# Patient Record
Sex: FEMALE | Race: WHITE | NOT HISPANIC OR LATINO | Employment: FULL TIME | URBAN - METROPOLITAN AREA
[De-identification: names, ages, dates, MRNs, and addresses within clinical notes are randomized per-mention and may not be internally consistent; named-entity substitution may affect disease eponyms.]

---

## 2017-09-29 PROBLEM — D22.9 ATYPICAL MOLE: Status: ACTIVE | Noted: 2017-09-29

## 2017-09-29 PROBLEM — L65.9 HAIR LOSS DISORDER: Status: ACTIVE | Noted: 2017-09-29

## 2017-09-29 PROBLEM — R53.83 FATIGUE: Status: ACTIVE | Noted: 2017-09-29

## 2018-11-08 PROBLEM — N20.1 URETERAL STONE: Status: ACTIVE | Noted: 2018-11-08

## 2018-11-08 PROBLEM — N20.0 KIDNEY STONE: Status: ACTIVE | Noted: 2018-11-08

## 2018-11-17 PROBLEM — N20.1 CALCULUS OF URETER: Status: ACTIVE | Noted: 2018-11-17

## 2021-05-10 ENCOUNTER — PATIENT MESSAGE (OUTPATIENT)
Dept: RESEARCH | Facility: HOSPITAL | Age: 51
End: 2021-05-10

## 2022-01-10 ENCOUNTER — OFFICE VISIT (OUTPATIENT)
Dept: URGENT CARE | Facility: CLINIC | Age: 52
End: 2022-01-10
Payer: COMMERCIAL

## 2022-01-10 VITALS
WEIGHT: 155 LBS | BODY MASS INDEX: 26.46 KG/M2 | DIASTOLIC BLOOD PRESSURE: 115 MMHG | RESPIRATION RATE: 16 BRPM | SYSTOLIC BLOOD PRESSURE: 183 MMHG | OXYGEN SATURATION: 100 % | HEIGHT: 64 IN | TEMPERATURE: 98 F | HEART RATE: 79 BPM

## 2022-01-10 DIAGNOSIS — R30.0 DYSURIA: Primary | ICD-10-CM

## 2022-01-10 LAB
BILIRUB UR QL STRIP: NEGATIVE
GLUCOSE UR QL STRIP: NEGATIVE
KETONES UR QL STRIP: NEGATIVE
LEUKOCYTE ESTERASE UR QL STRIP: POSITIVE
PH, POC UA: 5.5 (ref 5–8)
POC BLOOD, URINE: POSITIVE
POC NITRATES, URINE: NEGATIVE
PROT UR QL STRIP: POSITIVE
SP GR UR STRIP: 1.02 (ref 1–1.03)
UROBILINOGEN UR STRIP-ACNC: NORMAL (ref 0.1–1.1)

## 2022-01-10 PROCEDURE — 3008F PR BODY MASS INDEX (BMI) DOCUMENTED: ICD-10-PCS | Mod: CPTII,S$GLB,, | Performed by: FAMILY MEDICINE

## 2022-01-10 PROCEDURE — 3077F SYST BP >= 140 MM HG: CPT | Mod: CPTII,S$GLB,, | Performed by: FAMILY MEDICINE

## 2022-01-10 PROCEDURE — 1160F RVW MEDS BY RX/DR IN RCRD: CPT | Mod: CPTII,S$GLB,, | Performed by: FAMILY MEDICINE

## 2022-01-10 PROCEDURE — 1160F PR REVIEW ALL MEDS BY PRESCRIBER/CLIN PHARMACIST DOCUMENTED: ICD-10-PCS | Mod: CPTII,S$GLB,, | Performed by: FAMILY MEDICINE

## 2022-01-10 PROCEDURE — 3077F PR MOST RECENT SYSTOLIC BLOOD PRESSURE >= 140 MM HG: ICD-10-PCS | Mod: CPTII,S$GLB,, | Performed by: FAMILY MEDICINE

## 2022-01-10 PROCEDURE — 81003 URINALYSIS AUTO W/O SCOPE: CPT | Mod: QW,S$GLB,, | Performed by: FAMILY MEDICINE

## 2022-01-10 PROCEDURE — 99203 OFFICE O/P NEW LOW 30 MIN: CPT | Mod: 25,S$GLB,, | Performed by: FAMILY MEDICINE

## 2022-01-10 PROCEDURE — 81003 POCT URINALYSIS, DIPSTICK, AUTOMATED, W/O SCOPE: ICD-10-PCS | Mod: QW,S$GLB,, | Performed by: FAMILY MEDICINE

## 2022-01-10 PROCEDURE — 1159F MED LIST DOCD IN RCRD: CPT | Mod: CPTII,S$GLB,, | Performed by: FAMILY MEDICINE

## 2022-01-10 PROCEDURE — 99203 PR OFFICE/OUTPT VISIT, NEW, LEVL III, 30-44 MIN: ICD-10-PCS | Mod: 25,S$GLB,, | Performed by: FAMILY MEDICINE

## 2022-01-10 PROCEDURE — 3080F DIAST BP >= 90 MM HG: CPT | Mod: CPTII,S$GLB,, | Performed by: FAMILY MEDICINE

## 2022-01-10 PROCEDURE — 1159F PR MEDICATION LIST DOCUMENTED IN MEDICAL RECORD: ICD-10-PCS | Mod: CPTII,S$GLB,, | Performed by: FAMILY MEDICINE

## 2022-01-10 PROCEDURE — 3008F BODY MASS INDEX DOCD: CPT | Mod: CPTII,S$GLB,, | Performed by: FAMILY MEDICINE

## 2022-01-10 PROCEDURE — 3080F PR MOST RECENT DIASTOLIC BLOOD PRESSURE >= 90 MM HG: ICD-10-PCS | Mod: CPTII,S$GLB,, | Performed by: FAMILY MEDICINE

## 2022-01-10 RX ORDER — PHENAZOPYRIDINE HYDROCHLORIDE 200 MG/1
200 TABLET, FILM COATED ORAL 3 TIMES DAILY PRN
Qty: 6 TABLET | Refills: 2 | Status: SHIPPED | OUTPATIENT
Start: 2022-01-10 | End: 2022-01-12

## 2022-01-10 RX ORDER — FLUCONAZOLE 150 MG/1
150 TABLET ORAL DAILY
Qty: 3 TABLET | Refills: 1 | Status: SHIPPED | OUTPATIENT
Start: 2022-01-10 | End: 2022-01-11

## 2022-01-10 RX ORDER — CIPROFLOXACIN 500 MG/1
500 TABLET ORAL 2 TIMES DAILY
Qty: 20 TABLET | Refills: 0 | Status: SHIPPED | OUTPATIENT
Start: 2022-01-10 | End: 2022-01-20

## 2022-01-10 NOTE — PROGRESS NOTES
"Subjective:       Patient ID: Renetta Olvera is a 51 y.o. female.    Vitals:  height is 5' 4" (1.626 m) and weight is 70.3 kg (155 lb). Her temperature is 97.7 °F (36.5 °C). Her blood pressure is 183/115 (abnormal) and her pulse is 79. Her respiration is 16 and oxygen saturation is 100%.     Chief Complaint: Urinary Tract Infection    Patient presents to clinic with complaint of uti symptoms. She states that it has been going on for 5 days.     Urinary Tract Infection   This is a new problem. The current episode started in the past 7 days. The problem occurs every urination. The problem has been unchanged. The quality of the pain is described as burning. The pain is at a severity of 2/10. The pain is mild. There has been no fever. She is sexually active. There is no history of pyelonephritis. Associated symptoms include frequency and urgency. Pertinent negatives include no behavior changes, chills, discharge, flank pain, hematuria, hesitancy, nausea, possible pregnancy, sweats, vomiting, weight loss, bubble bath use, constipation, rash or withholding. She has tried nothing for the symptoms. The treatment provided no relief.       Constitution: Negative for chills.   Gastrointestinal: Negative for nausea, vomiting and constipation.   Genitourinary: Positive for frequency and urgency. Negative for flank pain and hematuria.   Skin: Negative for rash.       Objective:      Physical Exam      Physical Exam  Vitals signs and nursing note reviewed.   Constitutional:       Appearance: Pt is well-developed. Alert, NAD  HENT:      Head: Normocephalic and atraumatic. Pt appears well-developed and well-nourished. Pt is cooperative.  Non-toxic appearance. Pt does not have a sickly appearance. Pt does not appear ill. No distress     Right Ear: External ear normal. external ear and ear canal normal.      Left Ear: External ear normal. external ear and ear canal normal.   Eyes:      General: Lids are normal.      " Conjunctiva/sclera: Conjunctivae normal. Visual tracking is normal. Right eye exhibits no exudate. Left eye exhibits no exudate. No scleral icterus.     Pupils: Pupils are equal, round  Neck:      Musculoskeletal: Full passive range of motion without pain and neck supple.      Trachea: Trachea and phonation normal.   Cardiovascular:      Rate and Rhythm: Normal rate. Extremities well perfused.   Pulmonary:      Effort: Pulmonary effort is normal. No respiratory distress.       Abdomen: NO obvious distention. SP tenderness  Musculoskeletal: Normal range of motion. No ambulation issues  Skin:     General: Skin is warm and dry. No open wounds or abrasions. No petechiae No cyanosis  no jaundice not diaphoretic, not pale, not purpuric  Neurological:      Mental Status:Pt is alert and oriented to person, place, and time.   Psychiatric:         Speech: Speech normal.         Behavior: Behavior normal.         Thought Content: Thought content normal.         Judgment: Judgment normal.         Assessment:       1. Dysuria          Plan:         Dysuria  -     POCT Urinalysis, Dipstick, Automated, W/O Scope    Other orders  -     ciprofloxacin HCl (CIPRO) 500 MG tablet; Take 1 tablet (500 mg total) by mouth 2 (two) times daily. for 10 days  Dispense: 20 tablet; Refill: 0  -     fluconazole (DIFLUCAN) 150 MG Tab; Take 1 tablet (150 mg total) by mouth once daily. for 1 day  Dispense: 3 tablet; Refill: 1  -     phenazopyridine (PYRIDIUM) 200 MG tablet; Take 1 tablet (200 mg total) by mouth 3 (three) times daily as needed for Pain.  Dispense: 6 tablet; Refill: 2

## 2023-03-01 ENCOUNTER — PATIENT MESSAGE (OUTPATIENT)
Dept: PSYCHIATRY | Facility: CLINIC | Age: 53
End: 2023-03-01
Payer: COMMERCIAL

## 2023-03-02 ENCOUNTER — OFFICE VISIT (OUTPATIENT)
Dept: PSYCHIATRY | Facility: CLINIC | Age: 53
End: 2023-03-02
Payer: COMMERCIAL

## 2023-03-02 VITALS — WEIGHT: 147.25 LBS | BODY MASS INDEX: 25.14 KG/M2 | HEIGHT: 64 IN

## 2023-03-02 DIAGNOSIS — F41.1 GAD (GENERALIZED ANXIETY DISORDER): ICD-10-CM

## 2023-03-02 DIAGNOSIS — F33.1 MODERATE EPISODE OF RECURRENT MAJOR DEPRESSIVE DISORDER: Primary | ICD-10-CM

## 2023-03-02 PROCEDURE — 99999 PR PBB SHADOW E&M-EST. PATIENT-LVL III: CPT | Mod: PBBFAC,,,

## 2023-03-02 PROCEDURE — 3008F BODY MASS INDEX DOCD: CPT | Mod: CPTII,S$GLB,,

## 2023-03-02 PROCEDURE — 1160F PR REVIEW ALL MEDS BY PRESCRIBER/CLIN PHARMACIST DOCUMENTED: ICD-10-PCS | Mod: CPTII,S$GLB,,

## 2023-03-02 PROCEDURE — 90792 PR PSYCHIATRIC DIAGNOSTIC EVALUATION W/MEDICAL SERVICES: ICD-10-PCS | Mod: S$GLB,,,

## 2023-03-02 PROCEDURE — 1160F RVW MEDS BY RX/DR IN RCRD: CPT | Mod: CPTII,S$GLB,,

## 2023-03-02 PROCEDURE — 90792 PSYCH DIAG EVAL W/MED SRVCS: CPT | Mod: S$GLB,,,

## 2023-03-02 PROCEDURE — 99999 PR PBB SHADOW E&M-EST. PATIENT-LVL III: ICD-10-PCS | Mod: PBBFAC,,,

## 2023-03-02 PROCEDURE — 1159F MED LIST DOCD IN RCRD: CPT | Mod: CPTII,S$GLB,,

## 2023-03-02 PROCEDURE — 1159F PR MEDICATION LIST DOCUMENTED IN MEDICAL RECORD: ICD-10-PCS | Mod: CPTII,S$GLB,,

## 2023-03-02 PROCEDURE — 3008F PR BODY MASS INDEX (BMI) DOCUMENTED: ICD-10-PCS | Mod: CPTII,S$GLB,,

## 2023-03-02 RX ORDER — TRAZODONE HYDROCHLORIDE 50 MG/1
50 TABLET ORAL NIGHTLY PRN
Qty: 30 TABLET | Refills: 2 | Status: SHIPPED | OUTPATIENT
Start: 2023-03-02 | End: 2024-03-01

## 2023-03-02 RX ORDER — BUPROPION HYDROCHLORIDE 150 MG/1
150 TABLET ORAL DAILY
Qty: 30 TABLET | Refills: 1 | Status: SHIPPED | OUTPATIENT
Start: 2023-03-02 | End: 2024-03-01
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
## 2023-03-31 ENCOUNTER — TELEPHONE (OUTPATIENT)
Dept: PSYCHIATRY | Facility: CLINIC | Age: 53
End: 2023-03-31
Payer: COMMERCIAL

## 2023-03-31 NOTE — TELEPHONE ENCOUNTER
FELIZ in attempt to confirm pt in person psychiatry appointment for 4/4/23 at 8 am with Rhea Barragan. LM for pt to call clinic back whenever she gets a chance.

## 2023-04-04 ENCOUNTER — TELEPHONE (OUTPATIENT)
Dept: PSYCHIATRY | Facility: CLINIC | Age: 53
End: 2023-04-04

## 2023-04-04 NOTE — TELEPHONE ENCOUNTER
LMOV in attempt to reschedule pt in person 1 month follow up psychiatry appointment that pt cancel for 4/4/23 with Rhea Barragan. LM for pt to call clinic back whenever she gets a chance.

## 2024-07-03 ENCOUNTER — TELEPHONE (OUTPATIENT)
Dept: HEMATOLOGY/ONCOLOGY | Facility: CLINIC | Age: 54
End: 2024-07-03

## 2024-07-03 NOTE — TELEPHONE ENCOUNTER
Received referral in [facility] for dx of Thrombocytosis and mild anemia.  Called and spoke to pt about heme referral.  Scheduled pt for next available benign hem new pt appt July 26.   Pt confirmed the appt date time and location.

## 2024-07-11 NOTE — PROGRESS NOTES
"Hematology Consult Note    Renetta Olvera   female  1970  [id number]    07/26/2024    Referral physician:    Marcial Hidalgo MD [address] [place] [address]    Reason for consult: Anemia    Subjective:      History of Present Illness:    Renetta Olvera is a 53 y.o. y/o female referred for evaluation of Anemia. She reports, "I have had horrible uterine things going on." Dr. Freeman recommended a hysterectomy, but she has not scheduled.  She reports cycles that last 3-5 days with heavy bleeding and clots. She reports she has been anemic for at least 17 years. She denies blood in her stool or urine.   On today's visit, the pt denies fever, chills, headache, sore throat, neck swelling, chest pain, SOB, cough, N/V/D, Abd pain.  Dysuria, hematuria, or weakness in extremities. No back pain, bone pain. Stool or urine incontinence. Denies tingling numbness in fingers/toes.     Symptoms associated with diagnosis:  Fatigue, pica or ice, MCCOY, weakness, pallor, dry skin,     Wt loss? no  Night sweats? hormonal  Fever, chills? none    Allergies:  Review of patient's allergies indicates:   Allergen Reactions    Hydrochlorothiazide      Pruritis    Sulfa (sulfonamide antibiotics)      Don't remember pt was told by mother not to take sulfa     Current Medications:  Current Outpatient Medications on File Prior to Visit   Medication Sig Dispense Refill    amLODIPine (NORVASC) 5 MG tablet Take 1 tablet (5 mg total) by mouth once daily. 90 tablet 0     No current facility-administered medications on file prior to visit.     Past Medical History:  Past Medical History:   Diagnosis Date    a Family H/O CAD     "SEs q5yr At Age 50;" 11/10/17 RXd ASA 81 Mg Daily    b Hypertension 11/10/17    10/25/17 (Phone Log) RXd Valsartan 160 Mg Daily With BMP In 1 Week; HCTZ Caused Pruritis; On Norvasc 10 Mg Daily    c Mild Hypercholesteremia With High HDL     g Anemia     3/5/21 On Iron     k Kidney Stones " 2018    p OU Dry Eye Syndrome     Dr. Rhea Macias's 18 OV Note (Scanned)    q Vitamin D Deficiency     3/4/21 RXd [id number]     Past Surgical History:  Past Surgical History:   Procedure Laterality Date     SECTION      x 3    CYSTOURETEROSCOPY WITH RETROGRADE PYELOGRAPHY AND INSERTION OF STENT INTO URETER Right 2018    Procedure: CYSTOURETEROSCOPY, WITH RETROGRADE PYELOGRAM AND URETERAL STENT INSERTION;  Surgeon: Cale Disla MD;  Location: [facility] OR;  Service: Urology;  Laterality: Right;    CYSTOURETEROSCOPY WITH RETROGRADE PYELOGRAPHY AND INSERTION OF STENT INTO URETER Right 2018    Procedure: CYSTOURETEROSCOPY, WITH RETROGRADE PYELOGRAM AND URETERAL STENT INSERTION;  Surgeon: Cale Disla MD;  Location: [facility] OR;  Service: Urology;  Laterality: Right;    LASER LITHOTRIPSY Right 2018    Procedure: LITHOTRIPSY, USING LASER;  Surgeon: Cale Disla MD;  Location: [facility] OR;  Service: Urology;  Laterality: Right;     Social History:  Social History     Tobacco Use    Smoking status: Former     Current packs/day: 0.00     Types: Cigarettes     Quit date: 2006     Years since quittin.5    Smokeless tobacco: Never   Substance Use Topics    Alcohol use: No    Drug use: No     Family History:  Family History   Problem Relation Name Age of Onset    Hypertension Mother      Lupus Mother      Hyperlipidemia Mother      Breast cancer Mother  70        BRCA negative    No Known Problems Father      Breast cancer Sister  45    Breast cancer Maternal Aunt          age unknown     Review of Systems:  Review of Systems   Constitutional:  Positive for malaise/fatigue.   HENT: Negative.     Eyes: Negative.    Respiratory: Negative.          MCCOY   Cardiovascular: Negative.    Gastrointestinal:  Negative for blood in stool.   Genitourinary: Negative.  Negative for hematuria.   Musculoskeletal: Negative.    Skin: Negative.    Neurological:  Positive for dizziness.  "  Endo/Heme/Allergies: Negative.    Psychiatric/Behavioral: Negative.        Objective:     Physical Examination:  Vitals:   Blood pressure (!) 160/100, pulse 88, temperature 97.8 °F (36.6 °C), temperature source Temporal, resp. rate 18, height 5' 4" (1.626 m), weight 72.9 kg (160 lb 11.5 oz), last menstrual period 06/18/2024, SpO2 100%.    Physical Exam  Vitals reviewed.   Constitutional:       Appearance: Normal appearance.   Eyes:      Pupils: Pupils are equal, round, and reactive to light.   Cardiovascular:      Rate and Rhythm: Normal rate and regular rhythm.   Pulmonary:      Effort: Pulmonary effort is normal.      Breath sounds: Normal breath sounds.   Abdominal:      General: Bowel sounds are normal.      Palpations: Abdomen is soft.   Skin:     General: Skin is warm.   Neurological:      General: No focal deficit present.      Mental Status: She is alert and oriented to person, place, and time.   Psychiatric:         Mood and Affect: Mood normal.         Behavior: Behavior normal.        Most Recent Data:  CBC:   Lab Results   Component Value Date    WBC 7.87 07/25/2024    HGB 10.8 (L) 07/25/2024    HCT 36.2 (L) 07/25/2024     (H) 07/25/2024    MCV 73 (L) 07/25/2024    RDW 18.6 (H) 07/25/2024     BMP:   Lab Results   Component Value Date     07/12/2024    K 4.3 07/12/2024     07/12/2024    CO2 26 07/12/2024    BUN 16 07/12/2024    GLU 86 07/12/2024    CALCIUM 9.6 07/12/2024     LFTs:   Lab Results   Component Value Date    PROT 7.7 07/12/2024    ALBUMIN 4.5 07/12/2024    BILITOT 0.7 07/12/2024    AST 32 07/12/2024    ALKPHOS 90 07/12/2024    ALT 25 07/12/2024     Coags: No results found for: "INR", "PROTIME", "PTT"  FLP:   Lab Results   Component Value Date    CHOL 264 (H) 03/04/2021    HDL 91 (H) 03/04/2021    LDLCALC 149.2 03/04/2021    TRIG 119 03/04/2021    CHOLHDL 34.5 03/04/2021     DM:   Lab Results   Component Value Date    LDLCALC 149.2 03/04/2021    CREATININE 0.76 07/12/2024 "     Thyroid:   Lab Results   Component Value Date    TSH 3.68 06/27/2024     Anemia:   Lab Results   Component Value Date    IRON 25 (L) 07/25/2024    TIBC 416 07/25/2024    FERRITIN 5 (L) 07/25/2024       Radiology and other Results:  US Soft Tissue Head Neck    Result Date: 7/12/2024  EXAMINATION: US SOFT TISSUE HEAD NECK CLINICAL HISTORY: Nontoxic single thyroid nodule TECHNIQUE: Ultrasound of the thyroid and cervical lymph nodes was performed. COMPARISON: None. FINDINGS: Multiple sonographic images of the anterior neck were obtained.  Thyroid gland is well visualized and appears normal in size and configuration.  The right lobe measures 4.0 x 1.1 x 1.0 cm.  Left lobe measures 5.4 x 2.3 x 1.9 cm.  The isthmus measures 2 mm in thickness.  There are 2 small nodules on the right 1 of which measures 3 mm in diameter and the other 6 mm.  There is a dominant nodule on the left which measures 3.5 x 2.0 x 2.7 cm.  This occupies most of the mid and lower pole areas.     There is a dominant left thyroid nodule as described.  Fine needle aspiration should be considered. Electronically signed by: Meir Mclean MD Date:    07/12/2024 Time:    15:43    X-Ray Chest 1 View    Result Date: 6/27/2024  [facility] [place]ST 1 VW PORTABLE CLINICAL HISTORY: DIAGNOSIS:R42   Dizziness REASON FOR STUDY:dizziness ADDITIONAL HISTORY: None. PROVIDER COMMENTS: TECHNIQUE: [facility] [place] CHEST 1 VW PORTABLE COMPARISON: None. FINDINGS: Heart shadow is normal size. Lungs are clear. Costophrenic angles are sharp. No acute bone findings.    No acute findings Electronically Signed By: Marcial Steven MD 6/27/2024 11:13 CDT    CTA Head and Neck (xpd)    Result Date: 6/27/2024  [facility] [place] ANGIO HEAD & NECK CLINICAL HISTORY: DIAGNOSIS:R42   Dizziness REASON FOR STUDY:Stroke/TIA, determine embolic source ADDITIONAL HISTORY: None. PROVIDER COMMENTS: TECHNIQUE: TECHNIQUE CONTRAST:IOHEXOL 350 MG IODINE/ML INTRAVENOUS SOLUTION:100mL RADIATION DOSE:Total DLP is 964 mGy*cm.  This CT was performed utilizing automated exposure control and/or adjustment of mA according to patient size and/or iterative reconstruction technique(s). TECHNIQUE: CT images acquired through the head and neck after IV contrast administered per CTA protocol. Images reconstructed in 3 orthogonal planes. Coronal MIPS reconstructed and viewed. COMPARISON:None. FINDINGS: Nodule in the left thyroid lobe measures 2.6 cm diameter. There is bovine arch anatomy, normal variant. Carotid and vertebral arteries are widely patent. No aneurysmal dilatation or stenosis. Anterior and posterior intracranial circulation is patent. 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 No aneurysmal dilatation evident. No abnormal filling defects.    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 2.6 cm left thyroid nodule which can be further assessed with routine ultrasound. Electronically Signed By: Marcial Steven MD 6/27/2024 10:48 CDT    CT Head Without Contrast    Result Date: 6/27/2024  [facility] [place] CT HEAD WITHOUT CONTRAST CLINICAL HISTORY: DIAGNOSIS: REASON FOR STUDY:Neuro deficit, acute, stroke suspected ADDITIONAL HISTORY: None. PROVIDER COMMENTS: RADIATION DOSE:Total DLP is 1203 mGy*cm. This CT was performed utilizing automated exposed control and/or adjustment of mA according to patient size and/or iterative reconstruction technique(s). COMPARISON:None. FINDINGS: No abnormal intra-axial or extra-axial fluid collections. No mass effect or edema. Gray-white interface is maintained. Ventricles appear normal. No acute bone changes.    No acute findings. Called to Dr. Roman at 10:00 AM. Electronically Signed By: Marcial Steven MD 6/27/2024 10:02 CDT        Assessment     1. Iron deficiency anemia due to chronic blood loss    2. Mild chronic anemia    3. Thrombocytosis    4. Primary hypertension         Plan     # Iron Deficiency Anemia  -cbc with diff, ferritin, iron and TIBC in 4 months  -screening colonoscopy ordered  -Feraheme 510 mg X 2 doses.     #thrombocytosis   -discussed thrombocytosis resolving with iron infusions and improvement of anemia, will monitor and complete additional testing if not resolved.     #primary hypertension  -Message sent to Nirali Kinney NP regarding blood pressure.     Follow up in 4 months with labs completed    Thank you for the opportunity in participating in the care of this very nice patient. If you have any questions or concerns, please do not hesitate to contact me.

## 2024-07-25 ENCOUNTER — TELEPHONE (OUTPATIENT)
Dept: HEMATOLOGY/ONCOLOGY | Facility: CLINIC | Age: 54
End: 2024-07-25
Payer: COMMERCIAL

## 2024-07-25 DIAGNOSIS — D64.9 ANEMIA: Primary | ICD-10-CM

## 2024-07-26 ENCOUNTER — PATIENT MESSAGE (OUTPATIENT)
Dept: INFUSION THERAPY | Facility: HOSPITAL | Age: 54
End: 2024-07-26
Payer: COMMERCIAL

## 2024-07-26 ENCOUNTER — OFFICE VISIT (OUTPATIENT)
Dept: HEMATOLOGY/ONCOLOGY | Facility: CLINIC | Age: 54
End: 2024-07-26
Payer: COMMERCIAL

## 2024-07-26 VITALS
HEART RATE: 88 BPM | BODY MASS INDEX: 27.43 KG/M2 | RESPIRATION RATE: 18 BRPM | WEIGHT: 160.69 LBS | OXYGEN SATURATION: 100 % | TEMPERATURE: 98 F | HEIGHT: 64 IN | SYSTOLIC BLOOD PRESSURE: 160 MMHG | DIASTOLIC BLOOD PRESSURE: 100 MMHG

## 2024-07-26 DIAGNOSIS — D50.0 IRON DEFICIENCY ANEMIA DUE TO CHRONIC BLOOD LOSS: Primary | ICD-10-CM

## 2024-07-26 DIAGNOSIS — D75.839 THROMBOCYTOSIS: ICD-10-CM

## 2024-07-26 DIAGNOSIS — D64.9 MILD CHRONIC ANEMIA: ICD-10-CM

## 2024-07-26 DIAGNOSIS — I10 PRIMARY HYPERTENSION: ICD-10-CM

## 2024-07-26 PROBLEM — D50.9 IRON DEFICIENCY ANEMIA: Status: ACTIVE | Noted: 2024-07-26

## 2024-07-26 PROCEDURE — 99999 PR PBB SHADOW E&M-EST. PATIENT-LVL IV: CPT | Mod: PBBFAC,,, | Performed by: NURSE PRACTITIONER

## 2024-07-26 RX ORDER — EPINEPHRINE 0.3 MG/.3ML
0.3 INJECTION SUBCUTANEOUS ONCE AS NEEDED
OUTPATIENT
Start: 2024-07-26

## 2024-07-26 RX ORDER — SODIUM CHLORIDE 0.9 % (FLUSH) 0.9 %
10 SYRINGE (ML) INJECTION
OUTPATIENT
Start: 2024-07-26

## 2024-07-26 RX ORDER — DIPHENHYDRAMINE HYDROCHLORIDE 50 MG/ML
50 INJECTION INTRAMUSCULAR; INTRAVENOUS ONCE AS NEEDED
OUTPATIENT
Start: 2024-07-26

## 2024-07-26 RX ORDER — HEPARIN 100 UNIT/ML
500 SYRINGE INTRAVENOUS
OUTPATIENT
Start: 2024-07-26

## 2024-07-29 DIAGNOSIS — D50.0 IRON DEFICIENCY ANEMIA DUE TO CHRONIC BLOOD LOSS: Primary | ICD-10-CM

## 2024-08-05 ENCOUNTER — TELEPHONE (OUTPATIENT)
Dept: GASTROENTEROLOGY | Facility: CLINIC | Age: 54
End: 2024-08-05
Payer: COMMERCIAL

## 2024-08-13 ENCOUNTER — INFUSION (OUTPATIENT)
Dept: INFUSION THERAPY | Facility: HOSPITAL | Age: 54
End: 2024-08-13
Attending: NURSE PRACTITIONER
Payer: COMMERCIAL

## 2024-08-13 VITALS
BODY MASS INDEX: 27.4 KG/M2 | SYSTOLIC BLOOD PRESSURE: 156 MMHG | RESPIRATION RATE: 18 BRPM | TEMPERATURE: 98 F | OXYGEN SATURATION: 99 % | HEIGHT: 64 IN | WEIGHT: 160.5 LBS | HEART RATE: 74 BPM | DIASTOLIC BLOOD PRESSURE: 92 MMHG

## 2024-08-13 DIAGNOSIS — D50.0 IRON DEFICIENCY ANEMIA DUE TO CHRONIC BLOOD LOSS: Primary | ICD-10-CM

## 2024-08-13 PROCEDURE — 96374 THER/PROPH/DIAG INJ IV PUSH: CPT | Mod: PN

## 2024-08-13 PROCEDURE — 63600175 PHARM REV CODE 636 W HCPCS: Mod: JZ,JG,PN | Performed by: NURSE PRACTITIONER

## 2024-08-13 PROCEDURE — 25000003 PHARM REV CODE 250: Mod: PN | Performed by: NURSE PRACTITIONER

## 2024-08-13 RX ORDER — EPINEPHRINE 0.3 MG/.3ML
0.3 INJECTION SUBCUTANEOUS ONCE AS NEEDED
OUTPATIENT
Start: 2024-08-13

## 2024-08-13 RX ORDER — SODIUM CHLORIDE 0.9 % (FLUSH) 0.9 %
10 SYRINGE (ML) INJECTION
OUTPATIENT
Start: 2024-08-13

## 2024-08-13 RX ORDER — SODIUM CHLORIDE 0.9 % (FLUSH) 0.9 %
10 SYRINGE (ML) INJECTION
Status: DISCONTINUED | OUTPATIENT
Start: 2024-08-13 | End: 2024-08-13 | Stop reason: HOSPADM

## 2024-08-13 RX ORDER — DIPHENHYDRAMINE HYDROCHLORIDE 50 MG/ML
50 INJECTION INTRAMUSCULAR; INTRAVENOUS ONCE AS NEEDED
OUTPATIENT
Start: 2024-08-13

## 2024-08-13 RX ORDER — HEPARIN 100 UNIT/ML
500 SYRINGE INTRAVENOUS
OUTPATIENT
Start: 2024-08-13

## 2024-08-13 RX ADMIN — SODIUM CHLORIDE: 9 INJECTION, SOLUTION INTRAVENOUS at 11:08

## 2024-08-13 RX ADMIN — FERUMOXYTOL 510 MG: 510 INJECTION INTRAVENOUS at 12:08

## 2024-08-13 NOTE — PLAN OF CARE
Pt arrived to clinic today for Feraheme infusion and tolerated well. No changes throughout therapy. Pt aware of follow up appointments and side effects of drugs. Pt waited 30 minutes post infusion to monitor for s/s of reaction. Discharged to home. NAD.

## 2024-08-20 ENCOUNTER — INFUSION (OUTPATIENT)
Dept: INFUSION THERAPY | Facility: HOSPITAL | Age: 54
End: 2024-08-20
Attending: NURSE PRACTITIONER
Payer: COMMERCIAL

## 2024-08-20 VITALS
DIASTOLIC BLOOD PRESSURE: 84 MMHG | HEART RATE: 94 BPM | SYSTOLIC BLOOD PRESSURE: 140 MMHG | BODY MASS INDEX: 28.07 KG/M2 | TEMPERATURE: 98 F | RESPIRATION RATE: 14 BRPM | WEIGHT: 164.44 LBS | HEIGHT: 64 IN

## 2024-08-20 DIAGNOSIS — D50.0 IRON DEFICIENCY ANEMIA DUE TO CHRONIC BLOOD LOSS: Primary | ICD-10-CM

## 2024-08-20 PROCEDURE — 63600175 PHARM REV CODE 636 W HCPCS: Mod: JZ,JG,PN | Performed by: NURSE PRACTITIONER

## 2024-08-20 PROCEDURE — 25000003 PHARM REV CODE 250: Mod: PN | Performed by: NURSE PRACTITIONER

## 2024-08-20 PROCEDURE — 96365 THER/PROPH/DIAG IV INF INIT: CPT | Mod: PN

## 2024-08-20 RX ORDER — SODIUM CHLORIDE 0.9 % (FLUSH) 0.9 %
10 SYRINGE (ML) INJECTION
Status: CANCELLED | OUTPATIENT
Start: 2024-08-20

## 2024-08-20 RX ORDER — SODIUM CHLORIDE 0.9 % (FLUSH) 0.9 %
10 SYRINGE (ML) INJECTION
Status: DISCONTINUED | OUTPATIENT
Start: 2024-08-20 | End: 2024-08-20 | Stop reason: HOSPADM

## 2024-08-20 RX ORDER — DIPHENHYDRAMINE HYDROCHLORIDE 50 MG/ML
50 INJECTION INTRAMUSCULAR; INTRAVENOUS ONCE AS NEEDED
OUTPATIENT
Start: 2024-08-20

## 2024-08-20 RX ORDER — EPINEPHRINE 0.3 MG/.3ML
0.3 INJECTION SUBCUTANEOUS ONCE AS NEEDED
OUTPATIENT
Start: 2024-08-20

## 2024-08-20 RX ORDER — HEPARIN 100 UNIT/ML
500 SYRINGE INTRAVENOUS
OUTPATIENT
Start: 2024-08-20

## 2024-08-20 RX ADMIN — SODIUM CHLORIDE: 9 INJECTION, SOLUTION INTRAVENOUS at 11:08

## 2024-08-20 RX ADMIN — FERUMOXYTOL 510 MG: 510 INJECTION INTRAVENOUS at 12:08

## 2024-09-25 ENCOUNTER — TELEPHONE (OUTPATIENT)
Dept: GASTROENTEROLOGY | Facility: CLINIC | Age: 54
End: 2024-09-25
Payer: COMMERCIAL

## 2024-09-25 NOTE — TELEPHONE ENCOUNTER
Several attempts have been made to contact pt to schedule ordered procedure. Case request canceled. Letter placed in mail. Pt will be scheduled from this order upon call back.

## 2024-11-21 ENCOUNTER — TELEPHONE (OUTPATIENT)
Dept: HEMATOLOGY/ONCOLOGY | Facility: CLINIC | Age: 54
End: 2024-11-21
Payer: COMMERCIAL

## 2024-11-21 NOTE — TELEPHONE ENCOUNTER
LMOVM pt to remind of appt tomorrow [facility].Contact information was given should pt need to contact us back.

## 2024-11-21 NOTE — TELEPHONE ENCOUNTER
----- Message from Arcelia sent at 11/21/2024 12:32 PM CST -----  Type:  Reschedule Appointment Request    Caller is requesting to reschedule appointment.      Name of Caller:pt    When is the first available appointment?scheduled for 11/22    Symptoms:follow up    Would the patient rather a call back or a response via [facility]? Call back    Best Call Back Number:[phone]      Additional Information: pt is needing to reschedule due to work     Please call Back to advise. Thanks!

## 2024-11-27 ENCOUNTER — TELEPHONE (OUTPATIENT)
Dept: HEMATOLOGY/ONCOLOGY | Facility: CLINIC | Age: 54
End: 2024-11-27
Payer: COMMERCIAL

## 2024-11-29 ENCOUNTER — PATIENT MESSAGE (OUTPATIENT)
Dept: HEMATOLOGY/ONCOLOGY | Facility: CLINIC | Age: 54
End: 2024-11-29
Payer: COMMERCIAL

## 2024-11-29 ENCOUNTER — TELEPHONE (OUTPATIENT)
Dept: HEMATOLOGY/ONCOLOGY | Facility: CLINIC | Age: 54
End: 2024-11-29
Payer: COMMERCIAL

## 2024-11-29 NOTE — TELEPHONE ENCOUNTER
Called patient. No answer. LVM for pt. Needs to reschedule follow up appointment for today because labs have not been completed. Labs need to be complete before seeing LUISITO Simpson.

## 2024-12-27 ENCOUNTER — PATIENT MESSAGE (OUTPATIENT)
Dept: HEMATOLOGY/ONCOLOGY | Facility: CLINIC | Age: 54
End: 2024-12-27
Payer: COMMERCIAL

## 2024-12-27 ENCOUNTER — TELEPHONE (OUTPATIENT)
Dept: HEMATOLOGY/ONCOLOGY | Facility: CLINIC | Age: 54
End: 2024-12-27
Payer: COMMERCIAL

## 2024-12-27 ENCOUNTER — OFFICE VISIT (OUTPATIENT)
Dept: HEMATOLOGY/ONCOLOGY | Facility: CLINIC | Age: 54
End: 2024-12-27
Payer: COMMERCIAL

## 2024-12-27 VITALS
HEART RATE: 104 BPM | WEIGHT: 166 LBS | TEMPERATURE: 91 F | BODY MASS INDEX: 28.34 KG/M2 | HEIGHT: 64 IN | DIASTOLIC BLOOD PRESSURE: 82 MMHG | SYSTOLIC BLOOD PRESSURE: 156 MMHG | RESPIRATION RATE: 17 BRPM | OXYGEN SATURATION: 96 %

## 2024-12-27 DIAGNOSIS — D50.0 IRON DEFICIENCY ANEMIA DUE TO CHRONIC BLOOD LOSS: Primary | ICD-10-CM

## 2024-12-27 PROCEDURE — 99999 PR PBB SHADOW E&M-EST. PATIENT-LVL III: CPT | Mod: PBBFAC,,, | Performed by: NURSE PRACTITIONER

## 2024-12-27 NOTE — TELEPHONE ENCOUNTER
Patient was returning out call about an earlier appt for today. New appt time past, pt will come in fore her scheduled appt time for 3:30pm. Pt verbalized understanding and thanked me for calling.

## 2024-12-27 NOTE — PROGRESS NOTES
"Hematology Consult Note    Renetta Olvera   female  1970  [id number]    12/27/2024    Referral physician:    Marcial Hidalgo MD  [address] [place], [place] [address] [id number]    Reason for consult: Anemia    Subjective:      History of Present Illness:    Renetta Olvera is a 54 y.o. y/o female referred for evaluation of Anemia. She reports, "I have had horrible uterine things going on." Dr. Freeman recommended a hysterectomy, but she has not scheduled.  She reports cycles that last 3-5 days with heavy bleeding and clots. She reports she has been anemic for at least 17 years. She denies blood in her stool or urine.   On today's visit, the pt denies fever, chills, headache, sore throat, neck swelling, chest pain, SOB, cough, N/V/D, Abd pain.  Dysuria, hematuria, or weakness in extremities. No back pain, bone pain. Stool or urine incontinence. Denies tingling numbness in fingers/toes.     Today's Visit  Mrs. Olvera tolerated the IV iron without difficult. She reports she continues to have heavy cycles.  She  is planning on the hysterectomy but was unable to at this time. She was unable to get the colonoscopy but will call and schedule.  She reports improvement in her fatigue and no more MCCOY.  She states "I noticed a huge difference and I haven't chewed any ice." She reports she has not taken her blood pressure medication in about 5 days as she was out of town and did not bring the medication.   On today's visit, the pt denies fever, chills, headache, sore throat, neck swelling, chest pain, SOB, cough, N/V/D, Abd pain.  Dysuria, hematuria, or weakness in extremities. No back pain, bone pain. Stool or urine incontinence. Denies tingling numbness in fingers/toes.     Allergies:  Review of patient's allergies indicates:   Allergen Reactions    Hydrochlorothiazide      Pruritis    Sulfa (sulfonamide antibiotics)      Don't remember pt was told by mother not to take sulfa     Current " "Medications:  Current Outpatient Medications on File Prior to Visit   Medication Sig Dispense Refill    amLODIPine (NORVASC) 5 MG tablet TAKE 2 TABLETS BY MOUTH EVERY DAY 90 tablet 3     No current facility-administered medications on file prior to visit.     Past Medical History:  Past Medical History:   Diagnosis Date    a Family H/O CAD     "SEs q5yr At Age 50;" 11/10/17 RXd ASA 81 Mg Daily    b Hypertension 11/10/17    10/25/17 (Phone Log) RXd Valsartan 160 Mg Daily With BMP In 1 Week; HCTZ Caused Pruritis; On Norvasc 10 Mg Daily    c Mild Hypercholesteremia With High HDL     g Anemia     3/5/21 On Iron     k Kidney Stones 2018    p OU Dry Eye Syndrome     Dr. Rhea Macias's 18 OV Note (Scanned)    q Vitamin D Deficiency     3/4/21 RXd [id number]     Past Surgical History:  Past Surgical History:   Procedure Laterality Date     SECTION      x 3    CYSTOURETEROSCOPY WITH RETROGRADE PYELOGRAPHY AND INSERTION OF STENT INTO URETER Right 2018    Procedure: CYSTOURETEROSCOPY, WITH RETROGRADE PYELOGRAM AND URETERAL STENT INSERTION;  Surgeon: Cale Disla MD;  Location: [facility] OR;  Service: Urology;  Laterality: Right;    CYSTOURETEROSCOPY WITH RETROGRADE PYELOGRAPHY AND INSERTION OF STENT INTO URETER Right 2018    Procedure: CYSTOURETEROSCOPY, WITH RETROGRADE PYELOGRAM AND URETERAL STENT INSERTION;  Surgeon: Cale Disla MD;  Location: [facility] OR;  Service: Urology;  Laterality: Right;    LASER LITHOTRIPSY Right 2018    Procedure: LITHOTRIPSY, USING LASER;  Surgeon: Cale Disla MD;  Location: [facility] OR;  Service: Urology;  Laterality: Right;     Social History:  Social History     Tobacco Use    Smoking status: Former     Current packs/day: 0.00     Types: Cigarettes     Quit date: 2006     Years since quittin.0    Smokeless tobacco: Never   Substance Use Topics    Alcohol use: No    Drug use: No     Family History:  Family History   Problem Relation Name Age of " "Onset    Hypertension Mother      Lupus Mother      Hyperlipidemia Mother      Breast cancer Mother  70        BRCA negative    No Known Problems Father      Breast cancer Sister  45    Breast cancer Maternal Aunt          age unknown     Review of Systems:  Review of Systems   Constitutional: Negative.    HENT: Negative.     Eyes: Negative.    Respiratory: Negative.     Cardiovascular: Negative.    Gastrointestinal:  Negative for blood in stool and melena.   Genitourinary: Negative.  Negative for hematuria.   Musculoskeletal: Negative.    Skin: Negative.    Neurological: Negative.    Endo/Heme/Allergies: Negative.    Psychiatric/Behavioral: Negative.        Objective:     Physical Examination:  Vitals:    12/27/24 1530   BP: (!) 156/82   BP Location: Left arm   Patient Position: Sitting   Pulse: 104   Resp: 17   Temp: (P) 98 °F (36.7 °C)   TempSrc: Temporal   SpO2: 96%   Weight: 75.3 kg (166 lb 0.1 oz)   Height: 5' 4" (1.626 m)     Physical Exam  Vitals reviewed.   Constitutional:       Appearance: Normal appearance.   Eyes:      Pupils: Pupils are equal, round, and reactive to light.   Cardiovascular:      Rate and Rhythm: Normal rate and regular rhythm.   Pulmonary:      Effort: Pulmonary effort is normal.      Breath sounds: Normal breath sounds.   Abdominal:      General: Bowel sounds are normal.      Palpations: Abdomen is soft.   Skin:     General: Skin is warm.   Neurological:      General: No focal deficit present.      Mental Status: She is alert and oriented to person, place, and time.   Psychiatric:         Mood and Affect: Mood normal.         Behavior: Behavior normal.      Most Recent Data:  CBC:   Lab Results   Component Value Date    WBC 6.16 11/29/2024    HGB 14.2 11/29/2024    HCT 42.6 11/29/2024     11/29/2024    MCV 89 11/29/2024    RDW 12.7 11/29/2024     BMP:   Lab Results   Component Value Date     07/12/2024    K 4.3 07/12/2024     07/12/2024    CO2 26 07/12/2024    BUN 16 " 07/12/2024    GLU 86 07/12/2024    CALCIUM 9.6 07/12/2024     LFTs:   Lab Results   Component Value Date    PROT 7.7 07/12/2024    ALBUMIN 4.5 07/12/2024    BILITOT 0.7 07/12/2024    AST 32 07/12/2024    ALKPHOS 90 07/12/2024    ALT 25 07/12/2024     Coags:   Lab Results   Component Value Date    INR 1.0 08/05/2024     FLP:   Lab Results   Component Value Date    CHOL 264 (H) 03/04/2021    HDL 91 (H) 03/04/2021    LDLCALC 149.2 03/04/2021    TRIG 119 03/04/2021    CHOLHDL 34.5 03/04/2021     DM:   Lab Results   Component Value Date    LDLCALC 149.2 03/04/2021    CREATININE 0.76 07/12/2024     Thyroid:   Lab Results   Component Value Date    TSH 3.68 06/27/2024     Anemia:   Lab Results   Component Value Date    IRON 90 11/29/2024    TIBC 289 11/29/2024    FERRITIN 20 11/29/2024     Radiology and other Results:  US Soft Tissue Head Neck    Result Date: 7/12/2024  EXAMINATION: US SOFT TISSUE HEAD NECK CLINICAL HISTORY: Nontoxic single thyroid nodule TECHNIQUE: Ultrasound of the thyroid and cervical lymph nodes was performed. COMPARISON: None. FINDINGS: Multiple sonographic images of the anterior neck were obtained.  Thyroid gland is well visualized and appears normal in size and configuration.  The right lobe measures 4.0 x 1.1 x 1.0 cm.  Left lobe measures 5.4 x 2.3 x 1.9 cm.  The isthmus measures 2 mm in thickness.  There are 2 small nodules on the right 1 of which measures 3 mm in diameter and the other 6 mm.  There is a dominant nodule on the left which measures 3.5 x 2.0 x 2.7 cm.  This occupies most of the mid and lower pole areas.     There is a dominant left thyroid nodule as described.  Fine needle aspiration should be considered. Electronically signed by: Meir Mclean MD Date:    07/12/2024 Time:    15:43    X-Ray Chest 1 View    Result Date: 6/27/2024  [facility] XR CHEST 1 VW PORTABLE CLINICAL HISTORY: DIAGNOSIS:R42   Dizziness REASON FOR STUDY:dizziness ADDITIONAL HISTORY: None. PROVIDER COMMENTS: TECHNIQUE:  [facility] XR CHEST 1 VW PORTABLE COMPARISON: None. FINDINGS: Heart shadow is normal size. Lungs are clear. Costophrenic angles are sharp. No acute bone findings.    No acute findings Electronically Signed By: Marcial Steven MD 6/27/2024 11:13 CDT    CTA Head and Neck (xpd)    Result Date: 6/27/2024  [facility] CT ANGIO HEAD & NECK CLINICAL HISTORY: DIAGNOSIS:R42   Dizziness REASON FOR STUDY:Stroke/TIA, determine embolic source ADDITIONAL HISTORY: None. PROVIDER COMMENTS: TECHNIQUE: TECHNIQUE CONTRAST:IOHEXOL 350 MG IODINE/ML INTRAVENOUS SOLUTION:100mL RADIATION DOSE:Total DLP is 964 mGy*cm. This CT was performed utilizing automated exposure control and/or adjustment of mA according to patient size and/or iterative reconstruction technique(s). TECHNIQUE: CT images acquired through the head and neck after IV contrast administered per CTA protocol. Images reconstructed in 3 orthogonal planes. Coronal MIPS reconstructed and viewed. COMPARISON:None. FINDINGS: Nodule in the left thyroid lobe measures 2.6 cm diameter. There is bovine arch anatomy, normal variant. Carotid and vertebral arteries are widely patent. No aneurysmal dilatation or stenosis. Anterior and posterior intracranial circulation is patent. 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 No aneurysmal dilatation evident. No abnormal filling defects.    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 2.6 cm left thyroid nodule which can be further assessed with routine ultrasound. Electronically Signed By: Marcial Steven MD 6/27/2024 10:48 CDT    CT Head Without Contrast    Result Date: 6/27/2024  [facility] CT HEAD WITHOUT CONTRAST CLINICAL HISTORY: DIAGNOSIS: REASON FOR STUDY:Neuro deficit, acute, stroke suspected ADDITIONAL HISTORY: None. PROVIDER COMMENTS: RADIATION DOSE:Total DLP is 1203 mGy*cm. This CT was performed utilizing  automated exposed control and/or adjustment of mA according to patient size and/or iterative reconstruction technique(s). COMPARISON:None. FINDINGS: No abnormal intra-axial or extra-axial fluid collections. No mass effect or edema. Gray-white interface is maintained. Ventricles appear normal. No acute bone changes.    No acute findings. Called to Dr. Roman at 10:00 AM. Electronically Signed By: Marcial Steven MD 6/27/2024 10:02 CDT        Assessment     No diagnosis found.      Plan     # Iron Deficiency Anemia  -cbc with diff, ferritin, iron and TIBC in 4 months  -screening colonoscopy ordered  -Feraheme 510 mg X 2 doses.     #thrombocytosis   -discussed thrombocytosis resolving with iron infusions and improvement of anemia, will monitor and complete additional testing if not resolved.     #primary hypertension  -Message sent to Nirali Kinney NP regarding blood pressure.     Today's Visit  -Oral iron every other day. Recommended Celebrate iron + Vitamin C starting at the 30 mg dose.  If tolerated well increase to the 45 mg and then 60 mg dose.   -repeat cbc with diff, iron and TIBC, ferritin in 3 months    Follow up in 3 months with labs completed, can be virtual.        Med Onc Chart Routing      Follow up with physician    Follow up with [facility] 3 months. Can be virtual   Infusion scheduling note    Injection scheduling note    Labs CBC, ferritin and iron and TIBC   Scheduling:  Preferred lab:  Lab interval:  Labs in 3 months please   Imaging    Pharmacy appointment    Other referrals           Thank you for the opportunity in participating in the care of this very nice patient. If you have any questions or concerns, please do not hesitate to contact me.     I spent a total of 30 minutes on the day of the visit.This includes face to face time and non-face to face time preparing to see the patient (eg, review of tests), obtaining and/or reviewing separately obtained history, documenting clinical information in the  electronic or other health record, independently interpreting results and communicating results to the patient/family/caregiver, or care coordinator.

## 2024-12-27 NOTE — TELEPHONE ENCOUNTER
SORIN for pt to call in regards to her appt today 12/27/24 with Tatiana Raymundo NP. Called to to offer pt to move her appt up to this morning.If pt can not move appt up, her original appt time is fine. All contact infor was left for pt to return call.

## 2025-03-18 ENCOUNTER — TELEPHONE (OUTPATIENT)
Dept: HEMATOLOGY/ONCOLOGY | Facility: CLINIC | Age: 55
End: 2025-03-18
Payer: COMMERCIAL

## 2025-03-18 NOTE — TELEPHONE ENCOUNTER
----- Message from Omid sent at 3/18/2025  3:50 PM CDT -----  Type: Needs Medical AdviceWho Called:  pt Symptoms (please be specific):  How long has patient had these symptoms:  Pharmacy name and phone #:  Best Call Back Number:  [phone] Information: pt is requesting a call back to reschedule appt on 3/21

## 2025-04-03 ENCOUNTER — TELEPHONE (OUTPATIENT)
Dept: HEMATOLOGY/ONCOLOGY | Facility: CLINIC | Age: 55
End: 2025-04-03
Payer: COMMERCIAL

## 2025-04-03 NOTE — TELEPHONE ENCOUNTER
LMOVM pt to remind of virtual appt tomorrow with Tatiana Raymundo NP. Contact information was given should pt need to contact us back.

## 2025-04-04 ENCOUNTER — OFFICE VISIT (OUTPATIENT)
Dept: HEMATOLOGY/ONCOLOGY | Facility: CLINIC | Age: 55
End: 2025-04-04
Payer: COMMERCIAL

## 2025-04-04 DIAGNOSIS — D50.0 IRON DEFICIENCY ANEMIA DUE TO CHRONIC BLOOD LOSS: Primary | ICD-10-CM

## 2025-04-04 RX ORDER — HEPARIN 100 UNIT/ML
500 SYRINGE INTRAVENOUS
OUTPATIENT
Start: 2025-04-04

## 2025-04-04 RX ORDER — EPINEPHRINE 0.3 MG/.3ML
0.3 INJECTION SUBCUTANEOUS ONCE AS NEEDED
OUTPATIENT
Start: 2025-04-04

## 2025-04-04 RX ORDER — SODIUM CHLORIDE 0.9 % (FLUSH) 0.9 %
10 SYRINGE (ML) INJECTION
OUTPATIENT
Start: 2025-04-04

## 2025-04-04 NOTE — PROGRESS NOTES
"The patient location is: home  The chief complaint leading to consultation is: anemia    Visit type: audiovisual    Each patient to whom he or she provides medical services by telemedicine is:  (1) informed of the relationship between the physician and patient and the respective role of any other health care provider with respect to management of the patient; and (2) notified that he or she may decline to receive medical services by telemedicine and may withdraw from such care at any time.    Notes:   Hematology Consult Note    Renetta Olvera   female  1970  [id number]    04/04/2025    Referral physician:    Marcial Hidalgo MD  [address] [place] [address] DR WEI RICHARDSON [id number]    Reason for consult: Anemia    Subjective:      History of Present Illness:    Renetta Olvera is a 54 y.o. y/o female referred for evaluation of Anemia. She reports, "I have had horrible uterine things going on." Dr. Freeman recommended a hysterectomy, but she has not scheduled.  She reports cycles that last 3-5 days with heavy bleeding and clots. She reports she has been anemic for at least 17 years. She denies blood in her stool or urine.   On today's visit, the pt denies fever, chills, headache, sore throat, neck swelling, chest pain, SOB, cough, N/V/D, Abd pain.  Dysuria, hematuria, or weakness in extremities. No back pain, bone pain. Stool or urine incontinence. Denies tingling numbness in fingers/toes.     12/7/2024  Mrs. Olvera tolerated the IV iron without difficult. She reports she continues to have heavy cycles.  She  is planning on the hysterectomy but was unable to at this time. She was unable to get the colonoscopy but will call and schedule.  She reports improvement in her fatigue and no more MCCOY.  She states "I noticed a huge difference and I haven't chewed any ice." She reports she has not taken her blood pressure medication in about 5 days as she was out of town and did not bring the medication. " "  On today's visit, the pt denies fever, chills, headache, sore throat, neck swelling, chest pain, SOB, cough, N/V/D, Abd pain.  Dysuria, hematuria, or weakness in extremities. No back pain, bone pain. Stool or urine incontinence. Denies tingling numbness in fingers/toes.     Today's Visit  Mrs. Olvera reports she continues to have heavy menstrual cycles. She is considering a hysterectomy for the summer months due to work and her children's schedule. She has not scheduled a colonoscopy.  She continues to have fatigue, but not as severe as she was initially. She does not have pica for ice. She started celebrate iron 30 mg in January and is tolerating them well.   On today's visit, the pt denies fever, chills, headache, sore throat, neck swelling, chest pain, SOB, cough, N/V/D, Abd pain.  Dysuria, hematuria, or weakness in extremities. No back pain, bone pain. Stool or urine incontinence. Denies tingling numbness in fingers/toes.     Allergies:  Review of patient's allergies indicates:   Allergen Reactions    Hydrochlorothiazide      Pruritis    Sulfa (sulfonamide antibiotics)      Don't remember pt was told by mother not to take sulfa     Current Medications:  Current Outpatient Medications on File Prior to Visit   Medication Sig Dispense Refill    amLODIPine (NORVASC) 5 MG tablet TAKE 2 TABLETS BY MOUTH EVERY DAY 90 tablet 3    metoprolol succinate (TOPROL-XL) 25 MG 24 hr tablet Take 1 tablet (25 mg total) by mouth once daily. 90 tablet 3    UNABLE TO FIND Take 30 mg by mouth every other day. medication name: Celebrate Iron + Vitamin C       No current facility-administered medications on file prior to visit.     Past Medical History:  Past Medical History:   Diagnosis Date    a Family H/O CAD     "SEs q5yr At Age 50;" 11/10/17 RXd ASA 81 Mg Daily    b Hypertension 11/10/17    10/25/17 (Phone Log) RXd Valsartan 160 Mg Daily With BMP In 1 Week; HCTZ Caused Pruritis; On Norvasc 10 Mg Daily    c Mild " Hypercholesteremia With High HDL     g Anemia     3/5/21 On Iron     k Kidney Stones 2018    p OU Dry Eye Syndrome     Dr. Rhea Macias's 18 OV Note (Scanned)    q Vitamin D Deficiency     3/4/21 RXd [id number]     Past Surgical History:  Past Surgical History:   Procedure Laterality Date     SECTION      x 3    CYSTOURETEROSCOPY WITH RETROGRADE PYELOGRAPHY AND INSERTION OF STENT INTO URETER Right 2018    Procedure: CYSTOURETEROSCOPY, WITH RETROGRADE PYELOGRAM AND URETERAL STENT INSERTION;  Surgeon: Cale Disla MD;  Location: [facility] OR;  Service: Urology;  Laterality: Right;    CYSTOURETEROSCOPY WITH RETROGRADE PYELOGRAPHY AND INSERTION OF STENT INTO URETER Right 2018    Procedure: CYSTOURETEROSCOPY, WITH RETROGRADE PYELOGRAM AND URETERAL STENT INSERTION;  Surgeon: Cale Disla MD;  Location: [facility] OR;  Service: Urology;  Laterality: Right;    LASER LITHOTRIPSY Right 2018    Procedure: LITHOTRIPSY, USING LASER;  Surgeon: Cale Disla MD;  Location: [facility] OR;  Service: Urology;  Laterality: Right;     Social History:  Social History     Tobacco Use    Smoking status: Former     Current packs/day: 0.00     Types: Cigarettes     Quit date: 2006     Years since quittin.2    Smokeless tobacco: Never   Substance Use Topics    Alcohol use: No    Drug use: No     Family History:  Family History   Problem Relation Name Age of Onset    Hypertension Mother      Lupus Mother      Hyperlipidemia Mother      Breast cancer Mother  70        BRCA negative    No Known Problems Father      Breast cancer Sister  45    Breast cancer Maternal Aunt          age unknown     Review of Systems:  Review of Systems   Constitutional:  Positive for malaise/fatigue.   HENT: Negative.     Eyes: Negative.    Respiratory: Negative.  Negative for cough and shortness of breath.    Cardiovascular: Negative.  Negative for chest pain.   Gastrointestinal:  Negative for abdominal pain, blood in  stool, diarrhea and melena.   Genitourinary: Negative.  Negative for frequency and hematuria.   Musculoskeletal: Negative.  Negative for back pain.   Skin: Negative.  Negative for rash.   Neurological: Negative.  Negative for headaches.   Endo/Heme/Allergies: Negative.  Does not bruise/bleed easily.   Psychiatric/Behavioral:  The patient is not nervous/anxious.       Objective:     Physical Examination:  There were no vitals filed for this visit.    Physical Exam  Vitals reviewed.   Constitutional:       Appearance: Normal appearance.   Eyes:      Pupils: Pupils are equal, round, and reactive to light.   Cardiovascular:      Rate and Rhythm: Normal rate and regular rhythm.   Pulmonary:      Effort: Pulmonary effort is normal.      Breath sounds: Normal breath sounds.   Abdominal:      General: Bowel sounds are normal.      Palpations: Abdomen is soft.   Skin:     General: Skin is warm.   Neurological:      General: No focal deficit present.      Mental Status: She is alert and oriented to person, place, and time.   Psychiatric:         Mood and Affect: Mood normal.         Behavior: Behavior normal.        Most Recent Data:  CBC:   Lab Results   Component Value Date    WBC 7.37 04/01/2025    HGB 13.6 04/01/2025    HCT 42.0 04/01/2025     04/01/2025    MCV 91 04/01/2025    RDW 12.1 04/01/2025     BMP:   Lab Results   Component Value Date     07/12/2024    K 4.3 07/12/2024     07/12/2024    CO2 26 07/12/2024    BUN 16 07/12/2024    GLU 86 07/12/2024    CALCIUM 9.6 07/12/2024     LFTs:   Lab Results   Component Value Date    PROT 7.7 07/12/2024    ALBUMIN 4.5 07/12/2024    BILITOT 0.7 07/12/2024    AST 32 07/12/2024    ALKPHOS 90 07/12/2024    ALT 25 07/12/2024     Coags:   Lab Results   Component Value Date    INR 1.0 08/05/2024     FLP:   Lab Results   Component Value Date    CHOL 264 (H) 03/04/2021    HDL 91 (H) 03/04/2021    LDLCALC 149.2 03/04/2021    TRIG 119 03/04/2021    CHOLHDL 34.5  03/04/2021     DM:   Lab Results   Component Value Date    LDLCALC 149.2 03/04/2021    CREATININE 0.76 07/12/2024     Thyroid:   Lab Results   Component Value Date    TSH 3.68 06/27/2024     Anemia:   Lab Results   Component Value Date    IRON 90 04/01/2025    TIBC 425 04/01/2025    FERRITIN 19.0 (L) 04/01/2025     Radiology and other Results:  US Soft Tissue Head Neck    Result Date: 7/12/2024  EXAMINATION: US SOFT TISSUE HEAD NECK CLINICAL HISTORY: Nontoxic single thyroid nodule TECHNIQUE: Ultrasound of the thyroid and cervical lymph nodes was performed. COMPARISON: None. FINDINGS: Multiple sonographic images of the anterior neck were obtained.  Thyroid gland is well visualized and appears normal in size and configuration.  The right lobe measures 4.0 x 1.1 x 1.0 cm.  Left lobe measures 5.4 x 2.3 x 1.9 cm.  The isthmus measures 2 mm in thickness.  There are 2 small nodules on the right 1 of which measures 3 mm in diameter and the other 6 mm.  There is a dominant nodule on the left which measures 3.5 x 2.0 x 2.7 cm.  This occupies most of the mid and lower pole areas.     There is a dominant left thyroid nodule as described.  Fine needle aspiration should be considered. Electronically signed by: Meir Mclean MD Date:    07/12/2024 Time:    15:43    X-Ray Chest 1 View    Result Date: 6/27/2024  [facility] [place] XR CHEST 1 VW PORTABLE CLINICAL HISTORY: DIAGNOSIS:R42   Dizziness REASON FOR STUDY:dizziness ADDITIONAL HISTORY: None. PROVIDER COMMENTS: TECHNIQUE: [facility] [place] XR CHEST 1 VW PORTABLE COMPARISON: None. FINDINGS: Heart shadow is normal size. Lungs are clear. Costophrenic angles are sharp. No acute bone findings.    No acute findings Electronically Signed By: Marcial Steven MD 6/27/2024 11:13 CDT    CTA Head and Neck (xpd)    Result Date: 6/27/2024  [facility] [place] CT ANGIO HEAD & NECK CLINICAL HISTORY: DIAGNOSIS:R42   Dizziness REASON FOR STUDY:Stroke/TIA, determine embolic source ADDITIONAL HISTORY: None. PROVIDER COMMENTS: TECHNIQUE:  TECHNIQUE CONTRAST:IOHEXOL 350 MG IODINE/ML INTRAVENOUS SOLUTION:100mL RADIATION DOSE:Total DLP is 964 mGy*cm. This CT was performed utilizing automated exposure control and/or adjustment of mA according to patient size and/or iterative reconstruction technique(s). TECHNIQUE: CT images acquired through the head and neck after IV contrast administered per CTA protocol. Images reconstructed in 3 orthogonal planes. Coronal MIPS reconstructed and viewed. COMPARISON:None. FINDINGS: Nodule in the left thyroid lobe measures 2.6 cm diameter. There is bovine arch anatomy, normal variant. Carotid and vertebral arteries are widely patent. No aneurysmal dilatation or stenosis. Anterior and posterior intracranial circulation is patent. 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 No aneurysmal dilatation evident. No abnormal filling defects.    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 2.6 cm left thyroid nodule which can be further assessed with routine ultrasound. Electronically Signed By: Marcial Steven MD 6/27/2024 10:48 CDT    CT Head Without Contrast    Result Date: 6/27/2024  [facility] [place] CT HEAD WITHOUT CONTRAST CLINICAL HISTORY: DIAGNOSIS: REASON FOR STUDY:Neuro deficit, acute, stroke suspected ADDITIONAL HISTORY: None. PROVIDER COMMENTS: RADIATION DOSE:Total DLP is 1203 mGy*cm. This CT was performed utilizing automated exposed control and/or adjustment of mA according to patient size and/or iterative reconstruction technique(s). COMPARISON:None. FINDINGS: No abnormal intra-axial or extra-axial fluid collections. No mass effect or edema. Gray-white interface is maintained. Ventricles appear normal. No acute bone changes.    No acute findings. Called to Dr. Roman at 10:00 AM. Electronically Signed By: Marcial Steven MD 6/27/2024 10:02 CDT        Assessment     1. Iron deficiency anemia  due to chronic blood loss      Plan     # Iron Deficiency Anemia  -stop Celebrate iron  -Feraheme 510 mg X 1 doses.   -cbc with diff, ferritin, iron and TIBC in 3 months    Follow up in 3 months with labs completed, can be virtual.        Med Onc Chart Routing      Follow up with physician    Follow up with [facility] 3 months. can be virtual   Infusion scheduling note    Injection scheduling note    Labs Ferritin, iron and TIBC and CBC   Scheduling:  Preferred lab:  Lab interval:     Imaging    Pharmacy appointment    Other referrals               Thank you for the opportunity in participating in the care of this very nice patient. If you have any questions or concerns, please do not hesitate to contact me.     I spent a total of 42 minutes on the day of the visit.This includes face to face time and non-face to face time preparing to see the patient (eg, review of tests), obtaining and/or reviewing separately obtained history, documenting clinical information in the electronic or other health record, independently interpreting results and communicating results to the patient/family/caregiver, or care coordinator.   Answers submitted by the patient for this visit:  Review of Systems Questionnaire (Submitted on 4/4/2025)  appetite change : No  unexpected weight change: No  mouth sores: No  visual disturbance: No  adenopathy: No

## 2025-04-21 ENCOUNTER — PATIENT MESSAGE (OUTPATIENT)
Dept: INFUSION THERAPY | Facility: HOSPITAL | Age: 55
End: 2025-04-21
Payer: COMMERCIAL

## 2025-04-30 ENCOUNTER — PATIENT MESSAGE (OUTPATIENT)
Dept: INFUSION THERAPY | Facility: HOSPITAL | Age: 55
End: 2025-04-30
Payer: COMMERCIAL

## 2025-05-14 ENCOUNTER — TELEPHONE (OUTPATIENT)
Dept: INFUSION THERAPY | Facility: HOSPITAL | Age: 55
End: 2025-05-14
Payer: COMMERCIAL

## 2025-05-14 NOTE — TELEPHONE ENCOUNTER
----- Message from Veronique sent at 5/14/2025  1:45 PM CDT -----  Type: Needs Medical AdviceWho Called:  ptBest Call Back Number: [phone] Additional Information: pt requesting a call back for appt

## 2025-05-26 ENCOUNTER — INFUSION (OUTPATIENT)
Dept: INFUSION THERAPY | Facility: HOSPITAL | Age: 55
End: 2025-05-26
Attending: NURSE PRACTITIONER
Payer: COMMERCIAL

## 2025-05-26 VITALS
WEIGHT: 164.69 LBS | TEMPERATURE: 98 F | DIASTOLIC BLOOD PRESSURE: 97 MMHG | HEART RATE: 68 BPM | OXYGEN SATURATION: 98 % | SYSTOLIC BLOOD PRESSURE: 171 MMHG | BODY MASS INDEX: 28.12 KG/M2 | RESPIRATION RATE: 16 BRPM | HEIGHT: 64 IN

## 2025-05-26 DIAGNOSIS — D50.0 IRON DEFICIENCY ANEMIA DUE TO CHRONIC BLOOD LOSS: Primary | ICD-10-CM

## 2025-05-26 PROCEDURE — 25000003 PHARM REV CODE 250: Mod: PN | Performed by: NURSE PRACTITIONER

## 2025-05-26 PROCEDURE — 63600175 PHARM REV CODE 636 W HCPCS: Mod: JZ,TB,PN | Performed by: NURSE PRACTITIONER

## 2025-05-26 PROCEDURE — 96365 THER/PROPH/DIAG IV INF INIT: CPT | Mod: PN

## 2025-05-26 RX ORDER — HEPARIN 100 UNIT/ML
500 SYRINGE INTRAVENOUS
OUTPATIENT
Start: 2025-05-26

## 2025-05-26 RX ORDER — EPINEPHRINE 0.3 MG/.3ML
0.3 INJECTION SUBCUTANEOUS ONCE AS NEEDED
OUTPATIENT
Start: 2025-05-26

## 2025-05-26 RX ORDER — SODIUM CHLORIDE 0.9 % (FLUSH) 0.9 %
10 SYRINGE (ML) INJECTION
OUTPATIENT
Start: 2025-05-26

## 2025-05-26 RX ORDER — SODIUM CHLORIDE 0.9 % (FLUSH) 0.9 %
10 SYRINGE (ML) INJECTION
Status: DISCONTINUED | OUTPATIENT
Start: 2025-05-26 | End: 2025-05-26 | Stop reason: HOSPADM

## 2025-05-26 RX ADMIN — FERUMOXYTOL 510 MG: 510 INJECTION INTRAVENOUS at 02:05

## 2025-05-26 NOTE — PLAN OF CARE
Problem: Adult Inpatient Plan of Care  Goal: Optimal Comfort and Wellbeing  Outcome: Progressing  Intervention: Monitor Pain and Promote Comfort  Flowsheets (Taken 5/26/2025 1658)  Pain Management Interventions: warm blanket provided     Problem: Fatigue  Goal: Improved Activity Tolerance  Outcome: Progressing  Intervention: Promote Improved Energy  Flowsheets (Taken 5/26/2025 1658)  Environmental Support: rest periods encouraged

## 2025-05-26 NOTE — NURSING
Pt arrived to infusion clinic for  fereheme Pt tolerated well. Was observed for 30 minutes after infusion.  NAD noted at this time.

## 2025-07-10 ENCOUNTER — TELEPHONE (OUTPATIENT)
Dept: HEMATOLOGY/ONCOLOGY | Facility: CLINIC | Age: 55
End: 2025-07-10
Payer: COMMERCIAL